# Patient Record
Sex: MALE | Race: WHITE | URBAN - METROPOLITAN AREA
[De-identification: names, ages, dates, MRNs, and addresses within clinical notes are randomized per-mention and may not be internally consistent; named-entity substitution may affect disease eponyms.]

---

## 2021-09-10 ENCOUNTER — APPOINTMENT (RX ONLY)
Dept: URBAN - METROPOLITAN AREA CLINIC 20 | Facility: CLINIC | Age: 46
Setting detail: DERMATOLOGY
End: 2021-09-10

## 2021-09-10 DIAGNOSIS — Z71.89 OTHER SPECIFIED COUNSELING: ICD-10-CM

## 2021-09-10 DIAGNOSIS — L91.8 OTHER HYPERTROPHIC DISORDERS OF THE SKIN: ICD-10-CM

## 2021-09-10 DIAGNOSIS — D22 MELANOCYTIC NEVI: ICD-10-CM

## 2021-09-10 DIAGNOSIS — L82.1 OTHER SEBORRHEIC KERATOSIS: ICD-10-CM

## 2021-09-10 DIAGNOSIS — L81.4 OTHER MELANIN HYPERPIGMENTATION: ICD-10-CM

## 2021-09-10 DIAGNOSIS — I78.8 OTHER DISEASES OF CAPILLARIES: ICD-10-CM

## 2021-09-10 DIAGNOSIS — D18.0 HEMANGIOMA: ICD-10-CM

## 2021-09-10 DIAGNOSIS — L73.8 OTHER SPECIFIED FOLLICULAR DISORDERS: ICD-10-CM

## 2021-09-10 PROBLEM — D18.01 HEMANGIOMA OF SKIN AND SUBCUTANEOUS TISSUE: Status: ACTIVE | Noted: 2021-09-10

## 2021-09-10 PROBLEM — D22.71 MELANOCYTIC NEVI OF RIGHT LOWER LIMB, INCLUDING HIP: Status: ACTIVE | Noted: 2021-09-10

## 2021-09-10 PROBLEM — D22.61 MELANOCYTIC NEVI OF RIGHT UPPER LIMB, INCLUDING SHOULDER: Status: ACTIVE | Noted: 2021-09-10

## 2021-09-10 PROBLEM — D22.5 MELANOCYTIC NEVI OF TRUNK: Status: ACTIVE | Noted: 2021-09-10

## 2021-09-10 PROBLEM — D48.5 NEOPLASM OF UNCERTAIN BEHAVIOR OF SKIN: Status: ACTIVE | Noted: 2021-09-10

## 2021-09-10 PROBLEM — D22.62 MELANOCYTIC NEVI OF LEFT UPPER LIMB, INCLUDING SHOULDER: Status: ACTIVE | Noted: 2021-09-10

## 2021-09-10 PROBLEM — D22.72 MELANOCYTIC NEVI OF LEFT LOWER LIMB, INCLUDING HIP: Status: ACTIVE | Noted: 2021-09-10

## 2021-09-10 PROCEDURE — 99203 OFFICE O/P NEW LOW 30 MIN: CPT | Mod: 25

## 2021-09-10 PROCEDURE — ? COUNSELING

## 2021-09-10 PROCEDURE — 11102 TANGNTL BX SKIN SINGLE LES: CPT

## 2021-09-10 PROCEDURE — ? BIOPSY BY SHAVE METHOD

## 2021-09-10 ASSESSMENT — LOCATION DETAILED DESCRIPTION DERM
LOCATION DETAILED: LEFT ANTERIOR DISTAL THIGH
LOCATION DETAILED: LEFT INFERIOR CENTRAL MALAR CHEEK
LOCATION DETAILED: LEFT DISTAL POSTERIOR THIGH
LOCATION DETAILED: RIGHT VENTRAL PROXIMAL FOREARM
LOCATION DETAILED: RIGHT ANTERIOR DISTAL UPPER ARM
LOCATION DETAILED: RIGHT INFERIOR ANTERIOR NECK
LOCATION DETAILED: RIGHT PROXIMAL PRETIBIAL REGION
LOCATION DETAILED: LEFT PROXIMAL POSTERIOR THIGH
LOCATION DETAILED: LEFT MID-UPPER BACK
LOCATION DETAILED: LEFT ANTERIOR DISTAL UPPER ARM
LOCATION DETAILED: LEFT VENTRAL PROXIMAL FOREARM
LOCATION DETAILED: LEFT VENTRAL DISTAL FOREARM
LOCATION DETAILED: RIGHT PROXIMAL POSTERIOR THIGH
LOCATION DETAILED: RIGHT ANTERIOR DISTAL THIGH
LOCATION DETAILED: LEFT PROXIMAL PRETIBIAL REGION
LOCATION DETAILED: RIGHT VENTRAL DISTAL FOREARM
LOCATION DETAILED: RIGHT INFERIOR MEDIAL UPPER BACK
LOCATION DETAILED: RIGHT LATERAL MALAR CHEEK
LOCATION DETAILED: RIGHT DISTAL POSTERIOR THIGH
LOCATION DETAILED: LEFT SUPERIOR MEDIAL MIDBACK
LOCATION DETAILED: EPIGASTRIC SKIN
LOCATION DETAILED: LEFT PROXIMAL DORSAL FOREARM
LOCATION DETAILED: RIGHT PROXIMAL DORSAL FOREARM

## 2021-09-10 ASSESSMENT — LOCATION SIMPLE DESCRIPTION DERM
LOCATION SIMPLE: LEFT LOWER BACK
LOCATION SIMPLE: ABDOMEN
LOCATION SIMPLE: LEFT POSTERIOR THIGH
LOCATION SIMPLE: LEFT UPPER ARM
LOCATION SIMPLE: RIGHT FOREARM
LOCATION SIMPLE: RIGHT ANTERIOR NECK
LOCATION SIMPLE: RIGHT UPPER BACK
LOCATION SIMPLE: RIGHT POSTERIOR THIGH
LOCATION SIMPLE: RIGHT PRETIBIAL REGION
LOCATION SIMPLE: LEFT PRETIBIAL REGION
LOCATION SIMPLE: RIGHT UPPER ARM
LOCATION SIMPLE: RIGHT CHEEK
LOCATION SIMPLE: LEFT FOREARM
LOCATION SIMPLE: RIGHT THIGH
LOCATION SIMPLE: LEFT THIGH
LOCATION SIMPLE: LEFT CHEEK
LOCATION SIMPLE: LEFT UPPER BACK

## 2021-09-10 ASSESSMENT — LOCATION ZONE DERM
LOCATION ZONE: TRUNK
LOCATION ZONE: LEG
LOCATION ZONE: NECK
LOCATION ZONE: FACE
LOCATION ZONE: ARM

## 2021-09-10 NOTE — PROCEDURE: BIOPSY BY SHAVE METHOD

## 2023-04-14 ENCOUNTER — OFFICE VISIT (OUTPATIENT)
Dept: MEDICAL GROUP | Facility: LAB | Age: 48
End: 2023-04-14
Payer: COMMERCIAL

## 2023-04-14 VITALS
WEIGHT: 254 LBS | SYSTOLIC BLOOD PRESSURE: 132 MMHG | OXYGEN SATURATION: 95 % | HEIGHT: 76 IN | TEMPERATURE: 97 F | RESPIRATION RATE: 12 BRPM | BODY MASS INDEX: 30.93 KG/M2 | HEART RATE: 85 BPM | DIASTOLIC BLOOD PRESSURE: 86 MMHG

## 2023-04-14 DIAGNOSIS — Z87.891 FORMER SMOKER: ICD-10-CM

## 2023-04-14 DIAGNOSIS — E66.9 OBESITY (BMI 30-39.9): ICD-10-CM

## 2023-04-14 DIAGNOSIS — Z23 NEED FOR VACCINATION: ICD-10-CM

## 2023-04-14 DIAGNOSIS — Z12.5 PROSTATE CANCER SCREENING: ICD-10-CM

## 2023-04-14 DIAGNOSIS — K21.9 GASTROESOPHAGEAL REFLUX DISEASE WITHOUT ESOPHAGITIS: ICD-10-CM

## 2023-04-14 DIAGNOSIS — Z12.11 COLON CANCER SCREENING: ICD-10-CM

## 2023-04-14 DIAGNOSIS — Z00.00 HEALTH CARE MAINTENANCE: ICD-10-CM

## 2023-04-14 DIAGNOSIS — E78.49 OTHER HYPERLIPIDEMIA: ICD-10-CM

## 2023-04-14 PROCEDURE — 99204 OFFICE O/P NEW MOD 45 MIN: CPT | Mod: 25 | Performed by: STUDENT IN AN ORGANIZED HEALTH CARE EDUCATION/TRAINING PROGRAM

## 2023-04-14 PROCEDURE — 90715 TDAP VACCINE 7 YRS/> IM: CPT | Performed by: STUDENT IN AN ORGANIZED HEALTH CARE EDUCATION/TRAINING PROGRAM

## 2023-04-14 PROCEDURE — 90471 IMMUNIZATION ADMIN: CPT | Performed by: STUDENT IN AN ORGANIZED HEALTH CARE EDUCATION/TRAINING PROGRAM

## 2023-04-14 RX ORDER — M-VIT,TX,IRON,MINS/CALC/FOLIC 27MG-0.4MG
1 TABLET ORAL DAILY
COMMUNITY

## 2023-04-14 RX ORDER — OMEPRAZOLE 20 MG/1
20 CAPSULE, DELAYED RELEASE ORAL DAILY
COMMUNITY

## 2023-04-14 ASSESSMENT — ENCOUNTER SYMPTOMS
FEVER: 0
SHORTNESS OF BREATH: 0
CHILLS: 0

## 2023-04-14 NOTE — PROGRESS NOTES
"Subjective:     CC:  Diagnoses of Gastroesophageal reflux disease without esophagitis, Colon cancer screening, Former smoker, Health care maintenance, Prostate cancer screening, Other hyperlipidemia, Need for vaccination, and Obesity (BMI 30-39.9) were pertinent to this visit.    HISTORY OF THE PRESENT ILLNESS: Patient is a 47 y.o. male with the following medical problems No past medical history on file.  . This pleasant patient is here today to establish care and discuss the following;    Problem   Gastroesophageal Reflux Disease Without Esophagitis    Patient takes OTC omeprazole. No dysphagia      Former Smoker    Started smoking when he was roughly 18 and quit after 8 years      Other Hyperlipidemia    Elevated in the past. His mother is on atorvastatin     Obesity (Bmi 30-39.9)       Current Outpatient Medications Ordered in Epic   Medication Sig Dispense Refill    therapeutic multivitamin-minerals (THERAGRAN-M) Tab Take 1 Tablet by mouth every day.      omeprazole (PRILOSEC) 20 MG delayed-release capsule Take 20 mg by mouth every day.       No current [facility]-ordered facility-administered medications on file.         ROS:   Review of Systems   Constitutional:  Negative for chills and fever.   Respiratory:  Negative for shortness of breath.    Cardiovascular:  Negative for chest pain.       Objective:     Exam: /86 (BP Location: Left arm, Patient Position: Sitting, BP Cuff Size: Adult)   Pulse 85   Temp 36.1 °C (97 °F) (Temporal)   Resp 12   Ht 1.93 m (6' 4\")   Wt 115 kg (254 lb)   SpO2 95%  Body mass index is 30.92 kg/m².    Physical Exam  Constitutional:       General: He is not in acute distress.     Appearance: He is not ill-appearing.   Pulmonary:      Effort: Pulmonary effort is normal.   Neurological:      Mental Status: He is alert.   Psychiatric:         Mood and Affect: Mood normal.         Behavior: Behavior normal.         Thought Content: Thought content normal.         Judgment: Judgment " normal.             Assessment & Plan:     Problem List Items Addressed This Visit       Former smoker    Gastroesophageal reflux disease without esophagitis     Chronic-stable         Relevant Medications    omeprazole (PRILOSEC) 20 MG delayed-release capsule    Obesity (BMI 30-39.9)    Other hyperlipidemia     Chronic  -check lipid panel          Other Visit Diagnoses       Colon cancer screening        Relevant Orders    Referral to GI for Colonoscopy    Health care maintenance        Relevant Orders    VITAMIN D,25 HYDROXY (DEFICIENCY)    HEMOGLOBIN A1C    CBC WITH DIFFERENTIAL    Comp Metabolic Panel    TSH WITH REFLEX TO FT4    Lipid Profile    Prostate cancer screening        Relevant Orders    PROSTATE SPECIFIC AG SCREENING    Need for vaccination        Relevant Orders    Tdap =>8yo IM              Return in about 4 weeks (around 5/12/2023).    Please note that this dictation was created using voice recognition software. I have made every reasonable attempt to correct obvious errors, but I expect that there are errors of grammar and possibly content that I did not discover before finalizing the note.

## 2023-04-19 ENCOUNTER — HOSPITAL ENCOUNTER (OUTPATIENT)
Dept: LAB | Facility: MEDICAL CENTER | Age: 48
End: 2023-04-19
Attending: STUDENT IN AN ORGANIZED HEALTH CARE EDUCATION/TRAINING PROGRAM
Payer: COMMERCIAL

## 2023-04-19 DIAGNOSIS — Z12.5 PROSTATE CANCER SCREENING: ICD-10-CM

## 2023-04-19 DIAGNOSIS — Z00.00 HEALTH CARE MAINTENANCE: ICD-10-CM

## 2023-04-19 LAB
25(OH)D3 SERPL-MCNC: 50 NG/ML (ref 30–100)
ALBUMIN SERPL BCP-MCNC: 4.5 G/DL (ref 3.2–4.9)
ALBUMIN/GLOB SERPL: 1.7 G/DL
ALP SERPL-CCNC: 68 U/L (ref 30–99)
ALT SERPL-CCNC: 33 U/L (ref 2–50)
ANION GAP SERPL CALC-SCNC: 9 MMOL/L (ref 7–16)
AST SERPL-CCNC: 31 U/L (ref 12–45)
BASOPHILS # BLD AUTO: 0.5 % (ref 0–1.8)
BASOPHILS # BLD: 0.03 K/UL (ref 0–0.12)
BILIRUB SERPL-MCNC: 0.6 MG/DL (ref 0.1–1.5)
BUN SERPL-MCNC: 11 MG/DL (ref 8–22)
CALCIUM ALBUM COR SERPL-MCNC: 9.1 MG/DL (ref 8.5–10.5)
CALCIUM SERPL-MCNC: 9.5 MG/DL (ref 8.5–10.5)
CHLORIDE SERPL-SCNC: 105 MMOL/L (ref 96–112)
CHOLEST SERPL-MCNC: 243 MG/DL (ref 100–199)
CO2 SERPL-SCNC: 25 MMOL/L (ref 20–33)
CREAT SERPL-MCNC: 1.25 MG/DL (ref 0.5–1.4)
EOSINOPHIL # BLD AUTO: 0.1 K/UL (ref 0–0.51)
EOSINOPHIL NFR BLD: 1.8 % (ref 0–6.9)
ERYTHROCYTE [DISTWIDTH] IN BLOOD BY AUTOMATED COUNT: 41.6 FL (ref 35.9–50)
EST. AVERAGE GLUCOSE BLD GHB EST-MCNC: 108 MG/DL
FASTING STATUS PATIENT QL REPORTED: NORMAL
GFR SERPLBLD CREATININE-BSD FMLA CKD-EPI: 70 ML/MIN/1.73 M 2
GLOBULIN SER CALC-MCNC: 2.7 G/DL (ref 1.9–3.5)
GLUCOSE SERPL-MCNC: 108 MG/DL (ref 65–99)
HBA1C MFR BLD: 5.4 % (ref 4–5.6)
HCT VFR BLD AUTO: 50.1 % (ref 42–52)
HDLC SERPL-MCNC: 37 MG/DL
HGB BLD-MCNC: 17.7 G/DL (ref 14–18)
IMM GRANULOCYTES # BLD AUTO: 0.02 K/UL (ref 0–0.11)
IMM GRANULOCYTES NFR BLD AUTO: 0.4 % (ref 0–0.9)
LDLC SERPL CALC-MCNC: 165 MG/DL
LYMPHOCYTES # BLD AUTO: 1.97 K/UL (ref 1–4.8)
LYMPHOCYTES NFR BLD: 35.8 % (ref 22–41)
MCH RBC QN AUTO: 33 PG (ref 27–33)
MCHC RBC AUTO-ENTMCNC: 35.3 G/DL (ref 33.7–35.3)
MCV RBC AUTO: 93.5 FL (ref 81.4–97.8)
MONOCYTES # BLD AUTO: 0.59 K/UL (ref 0–0.85)
MONOCYTES NFR BLD AUTO: 10.7 % (ref 0–13.4)
NEUTROPHILS # BLD AUTO: 2.79 K/UL (ref 1.82–7.42)
NEUTROPHILS NFR BLD: 50.8 % (ref 44–72)
NRBC # BLD AUTO: 0 K/UL
NRBC BLD-RTO: 0 /100 WBC
PLATELET # BLD AUTO: 167 K/UL (ref 164–446)
PMV BLD AUTO: 11 FL (ref 9–12.9)
POTASSIUM SERPL-SCNC: 4.4 MMOL/L (ref 3.6–5.5)
PROT SERPL-MCNC: 7.2 G/DL (ref 6–8.2)
PSA SERPL-MCNC: 0.75 NG/ML (ref 0–4)
RBC # BLD AUTO: 5.36 M/UL (ref 4.7–6.1)
SODIUM SERPL-SCNC: 139 MMOL/L (ref 135–145)
T4 FREE SERPL-MCNC: 0.96 NG/DL (ref 0.93–1.7)
TRIGL SERPL-MCNC: 204 MG/DL (ref 0–149)
TSH SERPL DL<=0.005 MIU/L-ACNC: 5.36 UIU/ML (ref 0.38–5.33)
WBC # BLD AUTO: 5.5 K/UL (ref 4.8–10.8)

## 2023-04-19 PROCEDURE — 84439 ASSAY OF FREE THYROXINE: CPT

## 2023-04-19 PROCEDURE — 83036 HEMOGLOBIN GLYCOSYLATED A1C: CPT

## 2023-04-19 PROCEDURE — 82306 VITAMIN D 25 HYDROXY: CPT

## 2023-04-19 PROCEDURE — 85025 COMPLETE CBC W/AUTO DIFF WBC: CPT

## 2023-04-19 PROCEDURE — 80061 LIPID PANEL: CPT

## 2023-04-19 PROCEDURE — 84153 ASSAY OF PSA TOTAL: CPT

## 2023-04-19 PROCEDURE — 84443 ASSAY THYROID STIM HORMONE: CPT

## 2023-04-19 PROCEDURE — 36415 COLL VENOUS BLD VENIPUNCTURE: CPT

## 2023-04-19 PROCEDURE — 80053 COMPREHEN METABOLIC PANEL: CPT

## 2023-06-09 ENCOUNTER — OFFICE VISIT (OUTPATIENT)
Dept: MEDICAL GROUP | Facility: LAB | Age: 48
End: 2023-06-09
Payer: COMMERCIAL

## 2023-06-09 VITALS
BODY MASS INDEX: 30.95 KG/M2 | SYSTOLIC BLOOD PRESSURE: 130 MMHG | DIASTOLIC BLOOD PRESSURE: 82 MMHG | HEIGHT: 76 IN | TEMPERATURE: 97.5 F | OXYGEN SATURATION: 96 % | WEIGHT: 254.19 LBS | RESPIRATION RATE: 16 BRPM | HEART RATE: 86 BPM

## 2023-06-09 DIAGNOSIS — E03.8 SUBCLINICAL HYPOTHYROIDISM: ICD-10-CM

## 2023-06-09 DIAGNOSIS — E55.9 VITAMIN D DEFICIENCY: ICD-10-CM

## 2023-06-09 DIAGNOSIS — Z00.00 ANNUAL PHYSICAL EXAM: ICD-10-CM

## 2023-06-09 DIAGNOSIS — Z00.00 HEALTH MAINTENANCE EXAMINATION: ICD-10-CM

## 2023-06-09 DIAGNOSIS — E78.49 OTHER HYPERLIPIDEMIA: ICD-10-CM

## 2023-06-09 PROCEDURE — 3075F SYST BP GE 130 - 139MM HG: CPT | Performed by: STUDENT IN AN ORGANIZED HEALTH CARE EDUCATION/TRAINING PROGRAM

## 2023-06-09 PROCEDURE — 99396 PREV VISIT EST AGE 40-64: CPT | Performed by: STUDENT IN AN ORGANIZED HEALTH CARE EDUCATION/TRAINING PROGRAM

## 2023-06-09 PROCEDURE — 3079F DIAST BP 80-89 MM HG: CPT | Performed by: STUDENT IN AN ORGANIZED HEALTH CARE EDUCATION/TRAINING PROGRAM

## 2023-06-09 ASSESSMENT — ENCOUNTER SYMPTOMS
SPUTUM PRODUCTION: 0
BLOOD IN STOOL: 0
PALPITATIONS: 0
VOMITING: 0
ABDOMINAL PAIN: 0
CHILLS: 0
COUGH: 0
FEVER: 0

## 2023-06-09 ASSESSMENT — FIBROSIS 4 INDEX: FIB4 SCORE: 1.58

## 2023-06-09 ASSESSMENT — PATIENT HEALTH QUESTIONNAIRE - PHQ9: CLINICAL INTERPRETATION OF PHQ2 SCORE: 0

## 2023-06-09 NOTE — PROGRESS NOTES
Subjective:     Subjective:     CC:   Chief Complaint   Patient presents with    Annual Exam     Blood work       HPI:   Rick Francisco is a 47 y.o. male who presents for annual exam    Last Colorectal Cancer Screening: already ordered   Last Tdap: 2023  Received HPV series: Aged out  Hx STDs: No    Exercise: 3x/week cardiovascular   Diet: eats a lot of carb     He  has no past medical history on file.  He  has a past surgical history that includes hernia repair.    Family History   Problem Relation Age of Onset    Hyperlipidemia Mother     Leukemia Father     Hypertension Maternal Grandfather      Social History     Tobacco Use    Smoking status: Former     Types: Cigarettes    Smokeless tobacco: Never   Vaping Use    Vaping Use: Never used   Substance Use Topics    Alcohol use: Yes     Alcohol/week: 1.8 oz     Types: 3 Cans of beer per week    Drug use: Never     He  reports being sexually active.    Patient Active Problem List    Diagnosis Date Noted    Vitamin D deficiency 06/09/2023    Subclinical hypothyroidism 06/09/2023    Annual physical exam 06/09/2023    Gastroesophageal reflux disease without esophagitis 04/14/2023    Former smoker 04/14/2023    Other hyperlipidemia 04/14/2023    Obesity (BMI 30-39.9) 04/14/2023     Current Outpatient Medications   Medication Sig Dispense Refill    therapeutic multivitamin-minerals (THERAGRAN-M) Tab Take 1 Tablet by mouth every day.      omeprazole (PRILOSEC) 20 MG delayed-release capsule Take 20 mg by mouth every day.       No current facility-administered medications for this visit.     Allergies   Allergen Reactions    Penicillins Hives       Review of Systems   Review of Systems   Constitutional:  Negative for chills and fever.   Respiratory:  Negative for cough and sputum production.    Cardiovascular:  Negative for chest pain and palpitations.   Gastrointestinal:  Negative for abdominal pain, blood in stool and vomiting.        Objective:   /82 (BP  "Location: Right arm, Patient Position: Sitting, BP Cuff Size: Adult)   Pulse 86   Temp 36.4 °C (97.5 °F)   Resp 16   Ht 1.93 m (6' 4\")   Wt 115 kg (254 lb 3.1 oz)   SpO2 96%   BMI 30.94 kg/m²      Wt Readings from Last 4 Encounters:   06/09/23 115 kg (254 lb 3.1 oz)   04/14/23 115 kg (254 lb)           Physical Exam:  Physical Exam  Constitutional:       Appearance: Normal appearance.   HENT:      Head: Normocephalic and atraumatic.      Right Ear: Tympanic membrane and ear canal normal.      Left Ear: Tympanic membrane and ear canal normal.      Mouth/Throat:      Mouth: Mucous membranes are moist.      Pharynx: Oropharynx is clear.   Eyes:      Extraocular Movements: Extraocular movements intact.      Pupils: Pupils are equal, round, and reactive to light.   Neck:      Thyroid: No thyromegaly.   Cardiovascular:      Rate and Rhythm: Normal rate and regular rhythm.      Heart sounds: Normal heart sounds.   Pulmonary:      Effort: Pulmonary effort is normal.      Breath sounds: Normal breath sounds.   Abdominal:      General: Abdomen is flat. Bowel sounds are normal.      Palpations: Abdomen is soft.   Musculoskeletal:      Cervical back: Normal range of motion and neck supple.      Right lower leg: No edema.      Left lower leg: No edema.   Lymphadenopathy:      Cervical: No cervical adenopathy.   Skin:     General: Skin is warm and dry.   Neurological:      General: No focal deficit present.      Mental Status: He is alert.             Assessment and Plan:     Problem List Items Addressed This Visit       Annual physical exam    Other hyperlipidemia     Chronic  -will check cardiac score          Relevant Orders    CT-CARDIAC SCORING    Subclinical hypothyroidism    Vitamin D deficiency     Other Visit Diagnoses       Health maintenance examination        Relevant Orders    VITAMIN D,25 HYDROXY (DEFICIENCY)    HEMOGLOBIN A1C    CBC WITH DIFFERENTIAL    Comp Metabolic Panel    TSH WITH REFLEX TO FT4    Lipid " Profile             Health maintenance:    Labs per orders  Immunizations per orders  Patient counseled about skin care, diet, supplements, and exercise.  Discussed diet and exercise     Follow-up: Return in about 1 year (around 6/9/2024) for annual .

## 2023-06-27 ENCOUNTER — HOSPITAL ENCOUNTER (OUTPATIENT)
Dept: RADIOLOGY | Facility: MEDICAL CENTER | Age: 48
End: 2023-06-27
Attending: STUDENT IN AN ORGANIZED HEALTH CARE EDUCATION/TRAINING PROGRAM
Payer: COMMERCIAL

## 2023-06-27 DIAGNOSIS — E78.49 OTHER HYPERLIPIDEMIA: ICD-10-CM

## 2023-06-27 PROCEDURE — 4410556 CT-CARDIAC SCORING (SELF PAY ONLY)

## 2024-08-27 ENCOUNTER — APPOINTMENT (OUTPATIENT)
Dept: MEDICAL GROUP | Facility: LAB | Age: 49
End: 2024-08-27

## 2025-01-09 ENCOUNTER — APPOINTMENT (OUTPATIENT)
Dept: MEDICAL GROUP | Facility: LAB | Age: 50
End: 2025-01-09

## 2025-02-16 ENCOUNTER — APPOINTMENT (OUTPATIENT)
Dept: RADIOLOGY | Facility: MEDICAL CENTER | Age: 50
End: 2025-02-16
Attending: EMERGENCY MEDICINE
Payer: COMMERCIAL

## 2025-02-16 ENCOUNTER — PHARMACY VISIT (OUTPATIENT)
Dept: PHARMACY | Facility: MEDICAL CENTER | Age: 50
End: 2025-02-16
Payer: COMMERCIAL

## 2025-02-16 ENCOUNTER — HOSPITAL ENCOUNTER (EMERGENCY)
Facility: MEDICAL CENTER | Age: 50
End: 2025-02-16
Attending: EMERGENCY MEDICINE
Payer: COMMERCIAL

## 2025-02-16 VITALS
OXYGEN SATURATION: 96 % | BODY MASS INDEX: 30.15 KG/M2 | HEART RATE: 91 BPM | TEMPERATURE: 97.2 F | WEIGHT: 247.58 LBS | DIASTOLIC BLOOD PRESSURE: 89 MMHG | HEIGHT: 76 IN | SYSTOLIC BLOOD PRESSURE: 148 MMHG | RESPIRATION RATE: 16 BRPM

## 2025-02-16 DIAGNOSIS — I82.432 ACUTE DEEP VEIN THROMBOSIS (DVT) OF POPLITEAL VEIN OF LEFT LOWER EXTREMITY (HCC): ICD-10-CM

## 2025-02-16 LAB
ALBUMIN SERPL BCP-MCNC: 4.1 G/DL (ref 3.2–4.9)
ALBUMIN/GLOB SERPL: 1.2 G/DL
ALP SERPL-CCNC: 76 U/L (ref 30–99)
ALT SERPL-CCNC: 22 U/L (ref 2–50)
ANION GAP SERPL CALC-SCNC: 14 MMOL/L (ref 7–16)
AST SERPL-CCNC: 18 U/L (ref 12–45)
BASOPHILS # BLD AUTO: 0.3 % (ref 0–1.8)
BASOPHILS # BLD: 0.03 K/UL (ref 0–0.12)
BILIRUB SERPL-MCNC: 0.6 MG/DL (ref 0.1–1.5)
BUN SERPL-MCNC: 11 MG/DL (ref 8–22)
CALCIUM ALBUM COR SERPL-MCNC: 9.3 MG/DL (ref 8.5–10.5)
CALCIUM SERPL-MCNC: 9.4 MG/DL (ref 8.4–10.2)
CHLORIDE SERPL-SCNC: 102 MMOL/L (ref 96–112)
CO2 SERPL-SCNC: 23 MMOL/L (ref 20–33)
CREAT SERPL-MCNC: 1.13 MG/DL (ref 0.5–1.4)
EOSINOPHIL # BLD AUTO: 0.13 K/UL (ref 0–0.51)
EOSINOPHIL NFR BLD: 1.2 % (ref 0–6.9)
ERYTHROCYTE [DISTWIDTH] IN BLOOD BY AUTOMATED COUNT: 39.3 FL (ref 35.9–50)
GFR SERPLBLD CREATININE-BSD FMLA CKD-EPI: 78 ML/MIN/1.73 M 2
GLOBULIN SER CALC-MCNC: 3.4 G/DL (ref 1.9–3.5)
GLUCOSE SERPL-MCNC: 109 MG/DL (ref 65–99)
HCT VFR BLD AUTO: 45.4 % (ref 42–52)
HGB BLD-MCNC: 16.1 G/DL (ref 14–18)
IMM GRANULOCYTES # BLD AUTO: 0.04 K/UL (ref 0–0.11)
IMM GRANULOCYTES NFR BLD AUTO: 0.4 % (ref 0–0.9)
INR PPP: 1.08 (ref 0.87–1.13)
LYMPHOCYTES # BLD AUTO: 1.84 K/UL (ref 1–4.8)
LYMPHOCYTES NFR BLD: 17.2 % (ref 22–41)
MCH RBC QN AUTO: 32.7 PG (ref 27–33)
MCHC RBC AUTO-ENTMCNC: 35.5 G/DL (ref 32.3–36.5)
MCV RBC AUTO: 92.3 FL (ref 81.4–97.8)
MONOCYTES # BLD AUTO: 0.87 K/UL (ref 0–0.85)
MONOCYTES NFR BLD AUTO: 8.1 % (ref 0–13.4)
NEUTROPHILS # BLD AUTO: 7.78 K/UL (ref 1.82–7.42)
NEUTROPHILS NFR BLD: 72.8 % (ref 44–72)
NRBC # BLD AUTO: 0 K/UL
NRBC BLD-RTO: 0 /100 WBC (ref 0–0.2)
PLATELET # BLD AUTO: 166 K/UL (ref 164–446)
PMV BLD AUTO: 10.8 FL (ref 9–12.9)
POTASSIUM SERPL-SCNC: 4.3 MMOL/L (ref 3.6–5.5)
PROT SERPL-MCNC: 7.5 G/DL (ref 6–8.2)
PROTHROMBIN TIME: 14.4 SEC (ref 12–14.6)
RBC # BLD AUTO: 4.92 M/UL (ref 4.7–6.1)
SODIUM SERPL-SCNC: 139 MMOL/L (ref 135–145)
WBC # BLD AUTO: 10.7 K/UL (ref 4.8–10.8)

## 2025-02-16 PROCEDURE — 99284 EMERGENCY DEPT VISIT MOD MDM: CPT

## 2025-02-16 PROCEDURE — 93971 EXTREMITY STUDY: CPT | Mod: LT

## 2025-02-16 PROCEDURE — RXMED WILLOW AMBULATORY MEDICATION CHARGE: Performed by: EMERGENCY MEDICINE

## 2025-02-16 PROCEDURE — 700102 HCHG RX REV CODE 250 W/ 637 OVERRIDE(OP): Performed by: EMERGENCY MEDICINE

## 2025-02-16 PROCEDURE — 85610 PROTHROMBIN TIME: CPT

## 2025-02-16 PROCEDURE — 80053 COMPREHEN METABOLIC PANEL: CPT

## 2025-02-16 PROCEDURE — A9270 NON-COVERED ITEM OR SERVICE: HCPCS | Performed by: EMERGENCY MEDICINE

## 2025-02-16 PROCEDURE — 85025 COMPLETE CBC W/AUTO DIFF WBC: CPT

## 2025-02-16 PROCEDURE — 36415 COLL VENOUS BLD VENIPUNCTURE: CPT

## 2025-02-16 RX ORDER — OXYCODONE HYDROCHLORIDE 5 MG/1
5 TABLET ORAL EVERY 4 HOURS PRN
Qty: 20 TABLET | Refills: 0 | Status: SHIPPED | OUTPATIENT
Start: 2025-02-16 | End: 2025-02-21

## 2025-02-16 RX ORDER — OXYCODONE HYDROCHLORIDE 5 MG/1
5 TABLET ORAL EVERY 4 HOURS PRN
Qty: 20 TABLET | Refills: 0 | Status: SHIPPED | OUTPATIENT
Start: 2025-02-16 | End: 2025-02-16

## 2025-02-16 RX ADMIN — APIXABAN 10 MG: 5 TABLET, FILM COATED ORAL at 12:54

## 2025-02-16 ASSESSMENT — FIBROSIS 4 INDEX: FIB4 SCORE: 1.65

## 2025-02-16 NOTE — ED PROVIDER NOTES
"ED Provider Note    CHIEF COMPLAINT  Chief Complaint   Patient presents with    Leg Pain     L calf since Friday. Denies CP or SOB. Concerned for DVT.            HPI/ROS      Rick Francisco is a 49 y.o. male who presents with chief complaint of left calf pain.  Patient reports that Pain started spontaneously 2 days ago.  He reports he had some pain in his calf, and some swelling.  This is progressively worsened over the last 2 days.  He denies associated fevers or chills.  He reports some pain on ambulation in the back of his calf.  He denies any associated significant anterior knee pain or hip pain or ankle pain.  Denies any trauma.  Denies a history of blood clots.  He denies any chest pain shortness of breath.    PAST MEDICAL HISTORY   has a past medical history of Patient denies medical problems.    SURGICAL HISTORY   has a past surgical history that includes hernia repair.    FAMILY HISTORY  Family History   Problem Relation Age of Onset    Hyperlipidemia Mother     Leukemia Father     Hypertension Maternal Grandfather        SOCIAL HISTORY  Social History     Tobacco Use    Smoking status: Former     Types: Cigarettes    Smokeless tobacco: Never   Vaping Use    Vaping status: Never Used   Substance and Sexual Activity    Alcohol use: Yes     Alcohol/week: 1.8 oz     Types: 3 Cans of beer per week    Drug use: Never    Sexual activity: Yes       CURRENT MEDICATIONS  Home Medications       Reviewed by Safia Jaimes R.N. (Registered Nurse) on 02/16/25 at [facility]  Med List Status: Not Addressed     Medication Last Dose Status   omeprazole (PRILOSEC) 20 MG delayed-release capsule  Active   therapeutic multivitamin-minerals (THERAGRAN-M) Tab  Active                    ALLERGIES  Allergies   Allergen Reactions    Penicillins Hives       PHYSICAL EXAM  VITAL SIGNS: BP (!) 158/92   Pulse (!) 118   Temp 35.8 °C (96.5 °F) (Temporal)   Resp 18   Ht 1.93 m (6' 4\")   Wt 112 kg (247 lb 9.2 oz)   SpO2 94%   BMI 30.14 " kg/m²    Physical Exam  Constitutional:       Appearance: Normal appearance.   HENT:      Mouth/Throat:      Mouth: Mucous membranes are moist.   Pulmonary:      Effort: Pulmonary effort is normal.   Musculoskeletal:      Comments: Left lower extremity with increased diameter compared to right.  Trace pitting edema.  Mildly edematous.  No associated cords.  Full range of motion of hip knee and ankle without significant pain evoked.  Sensation intact throughout.  Distal pulses are 2+, cap refill is instantaneous.   Neurological:      General: No focal deficit present.      Mental Status: He is alert and oriented to person, place, and time.   Psychiatric:         Mood and Affect: Mood normal.           EKG/LABS  Results for orders placed or performed during the hospital encounter of 02/16/25   CBC WITH DIFFERENTIAL    Collection Time: 02/16/25 12:25 PM   Result Value Ref Range    WBC 10.7 4.8 - 10.8 K/uL    RBC 4.92 4.70 - 6.10 M/uL    Hemoglobin 16.1 14.0 - 18.0 g/dL    Hematocrit 45.4 42.0 - 52.0 %    MCV 92.3 81.4 - 97.8 fL    MCH 32.7 27.0 - 33.0 pg    MCHC 35.5 32.3 - 36.5 g/dL    RDW 39.3 35.9 - 50.0 fL    Platelet Count 166 164 - 446 K/uL    MPV 10.8 9.0 - 12.9 fL    Neutrophils-Polys 72.80 (H) 44.00 - 72.00 %    Lymphocytes 17.20 (L) 22.00 - 41.00 %    Monocytes 8.10 0.00 - 13.40 %    Eosinophils 1.20 0.00 - 6.90 %    Basophils 0.30 0.00 - 1.80 %    Immature Granulocytes 0.40 0.00 - 0.90 %    Nucleated RBC 0.00 0.00 - 0.20 /100 WBC    Neutrophils (Absolute) 7.78 (H) 1.82 - 7.42 K/uL    Lymphs (Absolute) 1.84 1.00 - 4.80 K/uL    Monos (Absolute) 0.87 (H) 0.00 - 0.85 K/uL    Eos (Absolute) 0.13 0.00 - 0.51 K/uL    Baso (Absolute) 0.03 0.00 - 0.12 K/uL    Immature Granulocytes (abs) 0.04 0.00 - 0.11 K/uL    NRBC (Absolute) 0.00 K/uL   CMP    Collection Time: 02/16/25 12:25 PM   Result Value Ref Range    Sodium 139 135 - 145 mmol/L    Potassium 4.3 3.6 - 5.5 mmol/L    Chloride 102 96 - 112 mmol/L    Co2 23 20 - 33  mmol/L    Anion Gap 14.0 7.0 - 16.0    Glucose 109 (H) 65 - 99 mg/dL    Bun 11 8 - 22 mg/dL    Creatinine 1.13 0.50 - 1.40 mg/dL    Calcium 9.4 8.4 - 10.2 mg/dL    Correct Calcium 9.3 8.5 - 10.5 mg/dL    AST(SGOT) 18 12 - 45 U/L    ALT(SGPT) 22 2 - 50 U/L    Alkaline Phosphatase 76 30 - 99 U/L    Total Bilirubin 0.6 0.1 - 1.5 mg/dL    Albumin 4.1 3.2 - 4.9 g/dL    Total Protein 7.5 6.0 - 8.2 g/dL    Globulin 3.4 1.9 - 3.5 g/dL    A-G Ratio 1.2 g/dL   PT/INR    Collection Time: 02/16/25 12:25 PM   Result Value Ref Range    PT 14.4 12.0 - 14.6 sec    INR 1.08 0.87 - 1.13   ESTIMATED GFR    Collection Time: 02/16/25 12:25 PM   Result Value Ref Range    GFR (CKD-EPI) 78 >60 mL/min/1.73 m 2       I have independently interpreted this EKG    RADIOLOGY/PROCEDURES   I have independently interpreted the diagnostic imaging associated with this visit and am waiting the final reading from the radiologist.   My preliminary interpretation is as follows: Large DVT extending femoral vein    Radiologist interpretation:  US-EXTREMITY VENOUS LOWER UNILAT LEFT   Final Result      LEFT leg DVT from mid femoral vein extending into the calf, likely acute.      These findings were electronically conveyed to and received by YOSHI MOSS on 2/16/2025 12:15 PM.          COURSE & MEDICAL DECISION MAKING    ASSESSMENT, COURSE AND PLAN  Care Narrative: Here with possible DVT versus ruptured Baker's cyst.  Checking ultrasound.  Patient already took 800 mg of ibuprofen prior to arrival and is feeling much improved.  He was offered analgesics but currently does not need them.  Patient DVT positive on ultrasound.  On reassessment patient remains with great pulses and sensation.  No tense compartments.  Patient will have basic labs checked to ensure he does not have any associated thrombocytopenia or severe anemia.  These are normal.  Patient started on DOAC.  Patient discharged in good condition.  Referral to anticoagulation clinic sent.  Patient  to follow-up close with his primary care provider.  Patient remains with reassuring vitals and no pulmonary symptoms.              FINAL DIAGNOSIS  1. Acute deep vein thrombosis (DVT) of popliteal vein of left lower extremity (HCC)  Referral to Anticoagulation Monitoring    apixaban (ELIQUIS) 5mg Tab    oxyCODONE immediate-release (ROXICODONE) 5 MG Tab

## 2025-02-16 NOTE — DISCHARGE INSTRUCTIONS
You will need to follow-up with your primary care provider.  They should do a workup on you for hypercoagulability to look into why you developed a blood clot.  Make sure you take the blood thinner as directed.  Do not miss any doses.  If you develop significant chest pain, shortness of breath or find that it becomes very difficult to walk a short distance without becoming very fatigued please return to the emergency department.  If your symptoms worsen otherwise significantly please return.  Try to keep your legs elevated when possible.  You can take Tylenol for pain.  For breakthrough pain you can use the oxycodone

## 2025-02-16 NOTE — ED NOTES
Extensive education r/t Eliquis, monitoring and follow up care.  Addressed multiple questions r/t Eliquis, monitoring and follow up care.

## 2025-02-16 NOTE — ED NOTES
Extensive discharge education w/ pt and visitor, addressed all further questions/concerns.   Pt thanked RN for time and care.  Pt ambulated from ED w/ visitor.

## 2025-02-16 NOTE — ED TRIAGE NOTES
"Chief Complaint   Patient presents with    Leg Pain     L calf since Friday. Denies CP or SOB. Concerned for DVT.      Physical Exam  Pulmonary:      Effort: Pulmonary effort is normal.   Skin:     General: Skin is warm and dry.   Neurological:      Mental Status: He is alert.       BP (!) 158/92   Pulse (!) 118   Temp 35.8 °C (96.5 °F) (Temporal)   Resp 18   Ht 1.93 m (6' 4\")   Wt 112 kg (247 lb 9.2 oz)   SpO2 94%   BMI 30.14 kg/m²     "

## 2025-02-16 NOTE — DISCHARGE PLANNING
Anticipated Discharge Disposition: Home    Action: Spoke with [facility] went thru but co pay Over 700.00.  Asked [facility] to eval [facility] also 500.00 plus. Unable to access Eliquis card program. Spoke with Pharmacist EDISON. Recommendation send to [facility] to see if can assist with Meds to Beds or Card program. Pharmacist spoke with [facility] Pharmacist    Barriers to Discharge: RX    Plan: Will cont to follow

## 2025-02-18 ENCOUNTER — TELEPHONE (OUTPATIENT)
Dept: VASCULAR LAB | Facility: MEDICAL CENTER | Age: 50
End: 2025-02-18
Payer: COMMERCIAL

## 2025-02-18 NOTE — DISCHARGE PLANNING
Anticipated Discharge Disposition: Home    Action: Call from wife. She notes that the leg slightly more swollen. She notes that has pain and increase swelling. Advised that he can return to ER for recheck. She placed pt on phone. He notes redness and  increased swelling. Encourage to return for recheck. Recommend return to [facility] if recheck . Discussed Eliquis and how it work, pain med, elevation and anticaog precautions ( no shaving No NSAID) encouraged to go Eliquis website as well. He should be following with anticoag clinic as well and PCP Dr Cornejo    Barriers to Discharge: None.    Plan:  No further needs tonight but they are aware to call back if needed and please return to ER for recheck

## 2025-02-18 NOTE — TELEPHONE ENCOUNTER
[facility]     Received anticoagulation referral from [facility] on 02/16/25.     Called and spoke to patient. Initial visit scheduled on 03/17/25 at 11:15am.    1st attempt     Insurance: [facility]  PCP: [facility]  Locations to be seen: Any    If no response by 03/18/25 OR 2 no shows/cancellations, will remove from referral list    Hiram Alonzo PharmD  [facility]armacotherapy [facility]  Phone [phone]

## 2025-02-19 ENCOUNTER — PATIENT MESSAGE (OUTPATIENT)
Dept: MEDICAL GROUP | Facility: LAB | Age: 50
End: 2025-02-19
Payer: COMMERCIAL

## 2025-02-19 DIAGNOSIS — I82.412 DEEP VENOUS THROMBOSIS OF LEFT PROFUNDA FEMORIS VEIN (HCC): ICD-10-CM

## 2025-02-19 DIAGNOSIS — I82.432 ACUTE DEEP VEIN THROMBOSIS (DVT) OF POPLITEAL VEIN OF LEFT LOWER EXTREMITY (HCC): ICD-10-CM

## 2025-02-21 ENCOUNTER — TELEPHONE (OUTPATIENT)
Dept: MEDICAL GROUP | Facility: LAB | Age: 50
End: 2025-02-21
Payer: COMMERCIAL

## 2025-02-21 NOTE — TELEPHONE ENCOUNTER
Patient called requesting referral to Vascular Surgeon.  Patient went to ER on  Sunday and had a blood clot.    Patient sent communication but has not yet heard back.    Patient states he is bedridden and needs this processed as quickly as possible.    Patient would also like to speak to provider regarding this issue.    Offered appt to patient, he states it's difficult to move and he would like his next appt to be w/ the vascular surgeon.      [phone] patient's contact #    Please contact

## 2025-03-11 ENCOUNTER — TELEPHONE (OUTPATIENT)
Dept: VASCULAR LAB | Facility: MEDICAL CENTER | Age: 50
End: 2025-03-11
Payer: COMMERCIAL

## 2025-03-11 NOTE — TELEPHONE ENCOUNTER
MAICO    Caller: Rick Francisco    Topic/issue: MEDICATION MANAGEMENT    Rick states that he needs a refill of the ELIQUIS that was prescribed during a hospital stay. Please advise.    Thank you,  Raymond MONSIVAIS    Callback Number: [phone] (home) [phone] (work)

## 2025-03-12 NOTE — TELEPHONE ENCOUNTER
Caller: Rick Francisco     Topic/issue: Patient is calling to get clarification the ELIQUIS and if it can be sent to [id number] - [place], [place] - [address] [[id number]]     Patient is also asking if there is more discount on the medication.    Callback Number: [phone]     Thank you,  Lanny DUQUE

## 2025-03-12 NOTE — TELEPHONE ENCOUNTER
Called and spoke to patient. He states that he will run out of supply next Tue, 03/18/25. He also notes that Eliquis is not covered by his insurance but he received his first month supply for free d/t the free trial card. Advised to keep NP appt on 03/17/25 with PharmD at [facility] who will be able to assist with Rx moving forward (may have to switch to alternative DOAC).    Will route to [facility] for assistance in determining which DOAC is preferred by pt's insurance to prevent delays in obtaining Rx after pt is seen on Mon.    Hiram Alonzo, ConstanzaD, BCACP

## 2025-03-13 ENCOUNTER — PATIENT MESSAGE (OUTPATIENT)
Dept: MEDICAL GROUP | Facility: LAB | Age: 50
End: 2025-03-13
Payer: COMMERCIAL

## 2025-03-13 DIAGNOSIS — I82.432 ACUTE DEEP VEIN THROMBOSIS (DVT) OF POPLITEAL VEIN OF LEFT LOWER EXTREMITY (HCC): ICD-10-CM

## 2025-03-14 ENCOUNTER — TELEPHONE (OUTPATIENT)
Dept: VASCULAR LAB | Facility: MEDICAL CENTER | Age: 50
End: 2025-03-14
Payer: COMMERCIAL

## 2025-03-14 NOTE — TELEPHONE ENCOUNTER
Received Refill PA request via  EMR   for ELIQUIS 5MG TABLETS . (Quantity:180, Day Supply:90)     Insurance: [employer]   Member ID:  [id number]  BIN: [id number]  PCN: [facility]  Group: [id number]     Ran Test claim via [place] & medication  pays for $895.24 copay. Received EMR from clinic staff that pt expressed concerns  that Eliquis is not coverd by the plan as was seeking for an alternative copay options for the pt. Based on my investigation, pt is eligible for copay card enrollment to help lower the cost down to $10/month ; $30/3 month supply.     Will outreach to pt to offer copay card enrollment and also to extend the offer to use [facility]     RACHELLE Green, PhT  Vascular Pharmacy Liaison (Rx Coordinator)  P: [phone]  3/14/2025 4:56 PM

## 2025-03-17 ENCOUNTER — ANTICOAGULATION VISIT (OUTPATIENT)
Dept: MEDICAL GROUP | Facility: MEDICAL CENTER | Age: 50
End: 2025-03-17
Payer: COMMERCIAL

## 2025-03-17 VITALS
WEIGHT: 239.86 LBS | DIASTOLIC BLOOD PRESSURE: 73 MMHG | BODY MASS INDEX: 29.21 KG/M2 | HEART RATE: 78 BPM | SYSTOLIC BLOOD PRESSURE: 120 MMHG | HEIGHT: 76 IN

## 2025-03-17 DIAGNOSIS — I82.432 ACUTE DEEP VEIN THROMBOSIS (DVT) OF POPLITEAL VEIN OF LEFT LOWER EXTREMITY (HCC): ICD-10-CM

## 2025-03-17 DIAGNOSIS — I82.4Y2 ACUTE VENOUS EMBOLISM AND THROMBOSIS OF DEEP VESSELS OF PROXIMAL END OF LEFT LOWER EXTREMITY (HCC): ICD-10-CM

## 2025-03-17 PROBLEM — I82.4Y9 ACUTE VENOUS EMBOLISM AND THROMBOSIS OF DEEP VESSELS OF PROXIMAL LOWER EXTREMITY (HCC): Status: ACTIVE | Noted: 2025-03-17

## 2025-03-17 PROCEDURE — 99211 OFF/OP EST MAY X REQ PHY/QHP: CPT | Performed by: STUDENT IN AN ORGANIZED HEALTH CARE EDUCATION/TRAINING PROGRAM

## 2025-03-17 PROCEDURE — 3078F DIAST BP <80 MM HG: CPT | Performed by: STUDENT IN AN ORGANIZED HEALTH CARE EDUCATION/TRAINING PROGRAM

## 2025-03-17 PROCEDURE — RXMED WILLOW AMBULATORY MEDICATION CHARGE: Performed by: NURSE PRACTITIONER

## 2025-03-17 PROCEDURE — 3074F SYST BP LT 130 MM HG: CPT | Performed by: STUDENT IN AN ORGANIZED HEALTH CARE EDUCATION/TRAINING PROGRAM

## 2025-03-17 ASSESSMENT — FIBROSIS 4 INDEX: FIB4 SCORE: 1.18

## 2025-03-17 NOTE — Clinical Note
Hello - can you please check in about [employer] pricing for Eliquis for this patient - he cannot afford $700 copay.   He will be out of his medication tomorrow and needs to pick this up today. Thank you!!

## 2025-03-17 NOTE — PROGRESS NOTES
NEW DOAC     Anticoagulation Patient Findings      PCP: Jakub Cornejo D.O.  Dx: LLE DVT  CHADSVASC = n/a  HAS-BLED = n/a  Target End Date = 3 months (5/16/25)    Pt Hx: Patient diagnosed with DVT in the ED 2/16/25. Denies any trauma. Denies a history of blood clots. Denies family history of blood clots or prolonged travel.     Ultrasound: 2/16/25  FINDINGS:     REAL-TIME GRAY-SCALE IMAGING:  Real-time gray-scale imaging reveals no evidence of focal wall thickening.     COLOR AND DUPLEX DOPPLER IMAGING:  Echogenic regional present within the LEFT mid femoral vein extending into the calf involving peroneal and posterior tibial veins.  Absent Doppler flow abnormal response to compression.     IMPRESSION:     LEFT leg DVT from mid femoral vein extending into the calf, likely acute.    Labs:  Lab Results   Component Value Date/Time    WBC 10.7 02/16/2025 12:25 PM    RBC 4.92 02/16/2025 12:25 PM    HEMOGLOBIN 16.1 02/16/2025 12:25 PM    HEMATOCRIT 45.4 02/16/2025 12:25 PM    MCV 92.3 02/16/2025 12:25 PM    MCH 32.7 02/16/2025 12:25 PM    MCHC 35.5 02/16/2025 12:25 PM    MPV 10.8 02/16/2025 12:25 PM    NEUTSPOLYS 72.80 (H) 02/16/2025 12:25 PM    LYMPHOCYTES 17.20 (L) 02/16/2025 12:25 PM    MONOCYTES 8.10 02/16/2025 12:25 PM    EOSINOPHILS 1.20 02/16/2025 12:25 PM    BASOPHILS 0.30 02/16/2025 12:25 PM      Lab Results   Component Value Date/Time    SODIUM 139 02/16/2025 12:25 PM    POTASSIUM 4.3 02/16/2025 12:25 PM    CHLORIDE 102 02/16/2025 12:25 PM    CO2 23 02/16/2025 12:25 PM    GLUCOSE 109 (H) 02/16/2025 12:25 PM    BUN 11 02/16/2025 12:25 PM    CREATININE 1.13 02/16/2025 12:25 PM          Pt is new to Eliquis and new to [facility]. Discussed:   Indication for DOAC therapy.  Importance of monitoring and compliance.   Monitoring parameters, signs and symptoms of bleeding or clotting.  DOAC therapy, side effects, potential DDIs, OTC medications  Hormonal therapy -n/a  Pregnancy -n/a  DDI - n/a  Pt NOT on antiplatelet/NSAID  therapy   Lifestyle safety, ie smoking, ETOH, hobby safety, fall safety/prevention  Procedures for missed doses or suspected missed doses, surgeries/procedures, travel, dental work, any medication changes    Continue Eliquis 5 mg BID    DOAC affordable = no, $700    Samples provided: no    Labs to be completed prior to next f/u - CBC, CMP    F/U - 1 month with vascular provider    Crystal Cassidy, ConstanzaD      Added [facility] to care team   CC: Dr Bloch, Vascular pharmacy coordinator

## 2025-03-18 ENCOUNTER — DOCUMENTATION (OUTPATIENT)
Dept: VASCULAR LAB | Facility: MEDICAL CENTER | Age: 50
End: 2025-03-18
Payer: COMMERCIAL

## 2025-03-18 ENCOUNTER — PHARMACY VISIT (OUTPATIENT)
Dept: PHARMACY | Facility: MEDICAL CENTER | Age: 50
End: 2025-03-18
Payer: COMMERCIAL

## 2025-03-18 NOTE — PROGRESS NOTES
Initial anticoagulation clinic note and most recent ER note reviewed  Patient started on anticoagulation with apixaban after being diagnosed with femoral DVT, first episode, appears unprovoked.    We'll continue with at least 3 months of oral anticoagulation after which time we can discuss risks and benefits of extended anticoagulation in vascular medicine clinic as scheduled.     ACCP guidelines to have a IIb indication for extended anticoagulation in patients with unprovoked DVT/PE who tolerated anticoagulation well.    Will defer any indicated age appropriate screening for occult malignancy to pcp.      Michael J. Bloch, MD  Anticoagulation Center    Cc:  FILOMENA Cornejo

## 2025-04-25 ENCOUNTER — APPOINTMENT (OUTPATIENT)
Dept: MEDICAL GROUP | Facility: LAB | Age: 50
End: 2025-04-25

## 2025-04-25 VITALS
TEMPERATURE: 97.1 F | SYSTOLIC BLOOD PRESSURE: 128 MMHG | HEART RATE: 88 BPM | WEIGHT: 246.2 LBS | HEIGHT: 76 IN | BODY MASS INDEX: 29.98 KG/M2 | DIASTOLIC BLOOD PRESSURE: 68 MMHG | OXYGEN SATURATION: 97 %

## 2025-04-25 DIAGNOSIS — Z12.11 COLON CANCER SCREENING: ICD-10-CM

## 2025-04-25 DIAGNOSIS — I82.412 ACUTE DEEP VEIN THROMBOSIS (DVT) OF FEMORAL VEIN OF LEFT LOWER EXTREMITY (HCC): ICD-10-CM

## 2025-04-25 PROBLEM — I82.402 ACUTE DEEP VEIN THROMBOSIS (DVT) OF LEFT LOWER EXTREMITY (HCC): Status: ACTIVE | Noted: 2025-03-17

## 2025-04-25 PROCEDURE — 3078F DIAST BP <80 MM HG: CPT | Performed by: STUDENT IN AN ORGANIZED HEALTH CARE EDUCATION/TRAINING PROGRAM

## 2025-04-25 PROCEDURE — 3074F SYST BP LT 130 MM HG: CPT | Performed by: STUDENT IN AN ORGANIZED HEALTH CARE EDUCATION/TRAINING PROGRAM

## 2025-04-25 PROCEDURE — 99396 PREV VISIT EST AGE 40-64: CPT | Performed by: STUDENT IN AN ORGANIZED HEALTH CARE EDUCATION/TRAINING PROGRAM

## 2025-04-25 ASSESSMENT — ENCOUNTER SYMPTOMS
CHILLS: 0
FEVER: 0
SHORTNESS OF BREATH: 0

## 2025-04-25 ASSESSMENT — FIBROSIS 4 INDEX: FIB4 SCORE: 1.18

## 2025-04-25 ASSESSMENT — PATIENT HEALTH QUESTIONNAIRE - PHQ9: CLINICAL INTERPRETATION OF PHQ2 SCORE: 0

## 2025-04-25 NOTE — PROGRESS NOTES
Subjective:     Subjective:     CC:   Chief Complaint   Patient presents with    Annual Exam       HPI:   Rick Francisco is a 49 y.o. male who presents for annual exam    Last Colorectal Cancer Screening: already ordered   Last Tdap: 2023  Received HPV series: Aged out  Hx STDs: No     Exercise: 45 minute walk daily   Diet: carnivore diet     He  has a past medical history of Patient denies medical problems.  He  has a past surgical history that includes hernia repair.    Family History   Problem Relation Age of Onset    Hyperlipidemia Mother     Leukemia Father     Hypertension Maternal Grandfather      Social History     Tobacco Use    Smoking status: Former     Types: Cigarettes    Smokeless tobacco: Never   Vaping Use    Vaping status: Never Used   Substance Use Topics    Alcohol use: Yes     Alcohol/week: 1.8 oz     Types: 3 Cans of beer per week    Drug use: Never     He  reports being sexually active.    Patient Active Problem List    Diagnosis Date Noted    Acute deep vein thrombosis (DVT) of left lower extremity (HCC) 03/17/2025    Vitamin D deficiency 06/09/2023    Subclinical hypothyroidism 06/09/2023    Annual physical exam 06/09/2023    Gastroesophageal reflux disease without esophagitis 04/14/2023    Former smoker 04/14/2023    Other hyperlipidemia 04/14/2023    Obesity (BMI 30-39.9) 04/14/2023     Current Outpatient Medications   Medication Sig Dispense Refill    apixaban (ELIQUIS) 5mg Tab Take 1 Tablet by mouth 2 times a day. 180 Tablet 0     No current facility-administered medications for this visit.     Allergies   Allergen Reactions    Penicillins Hives       Review of Systems   Review of Systems   Constitutional:  Negative for chills and fever.   Respiratory:  Negative for shortness of breath.    Cardiovascular:  Negative for chest pain.        Objective:   /68 (BP Location: Right arm, Patient Position: Sitting, BP Cuff Size: Large adult)   Pulse 88   Temp 36.2 °C (97.1 °F)  "(Temporal)   Ht 1.93 m (6' 4\")   Wt 112 kg (246 lb 3.2 oz)   SpO2 97%   BMI 29.97 kg/m²      Wt Readings from Last 4 Encounters:   04/25/25 112 kg (246 lb 3.2 oz)   03/17/25 109 kg (239 lb 13.8 oz)   02/16/25 112 kg (247 lb 9.2 oz)   06/09/23 115 kg (254 lb 3.1 oz)           Physical Exam:  Physical Exam  Constitutional:       General: He is not in acute distress.     Appearance: Normal appearance. He is not ill-appearing.   HENT:      Head: Normocephalic and atraumatic.      Right Ear: Tympanic membrane and ear canal normal.      Left Ear: Tympanic membrane and ear canal normal.      Mouth/Throat:      Mouth: Mucous membranes are moist.      Pharynx: Oropharynx is clear.   Eyes:      Extraocular Movements: Extraocular movements intact.      Pupils: Pupils are equal, round, and reactive to light.   Neck:      Thyroid: No thyromegaly.   Cardiovascular:      Rate and Rhythm: Normal rate and regular rhythm.      Heart sounds: Normal heart sounds.   Pulmonary:      Effort: Pulmonary effort is normal.      Breath sounds: Normal breath sounds.   Abdominal:      General: Abdomen is flat. Bowel sounds are normal.      Palpations: Abdomen is soft.   Musculoskeletal:      Cervical back: Normal range of motion and neck supple.      Right lower leg: No edema.      Left lower leg: No edema.   Lymphadenopathy:      Cervical: No cervical adenopathy.   Skin:     General: Skin is warm and dry.   Neurological:      General: No focal deficit present.      Mental Status: He is alert.   Psychiatric:         Mood and Affect: Mood normal.         Behavior: Behavior normal.         Thought Content: Thought content normal.         Judgment: Judgment normal.             Assessment and Plan:     Problem List Items Addressed This Visit       Acute deep vein thrombosis (DVT) of left lower extremity (HCC)     Other Visit Diagnoses         Colon cancer screening        Relevant Orders    Referral to GI for Colonoscopy             Health " maintenance:    Labs per orders  Immunizations per orders  Age-appropriate guidance discussed including diet and exercise  Discussed benefits of completing     Follow-up: 1 year annual

## 2025-04-30 ENCOUNTER — PATIENT MESSAGE (OUTPATIENT)
Dept: VASCULAR LAB | Facility: MEDICAL CENTER | Age: 50
End: 2025-04-30

## 2025-04-30 ENCOUNTER — OFFICE VISIT (OUTPATIENT)
Dept: VASCULAR LAB | Facility: MEDICAL CENTER | Age: 50
End: 2025-04-30
Attending: NURSE PRACTITIONER
Payer: COMMERCIAL

## 2025-04-30 VITALS
BODY MASS INDEX: 30.08 KG/M2 | SYSTOLIC BLOOD PRESSURE: 136 MMHG | HEIGHT: 76 IN | DIASTOLIC BLOOD PRESSURE: 82 MMHG | WEIGHT: 247 LBS | HEART RATE: 98 BPM

## 2025-04-30 DIAGNOSIS — D68.59 HYPERCOAGULABLE STATE (HCC): ICD-10-CM

## 2025-04-30 DIAGNOSIS — I82.412 ACUTE DEEP VEIN THROMBOSIS (DVT) OF FEMORAL VEIN OF LEFT LOWER EXTREMITY (HCC): Primary | ICD-10-CM

## 2025-04-30 PROCEDURE — 99212 OFFICE O/P EST SF 10 MIN: CPT

## 2025-04-30 ASSESSMENT — ENCOUNTER SYMPTOMS
BRUISES/BLEEDS EASILY: 0
PALPITATIONS: 0
BLOOD IN STOOL: 0

## 2025-04-30 ASSESSMENT — FIBROSIS 4 INDEX: FIB4 SCORE: 1.18

## 2025-04-30 NOTE — PROGRESS NOTES
VASC[facility] MED[facility]INE CLINIC - INITIAL VISIT (ANTICOAGULATION)  04/30/25     Referred for length of therapy (LOT) determination of anticoagulation in context of acute venothromboembolic disease    Subjective    HPI:    VTE disease / Anticoagulation:   Date of initiation of anticoagulation: 2/16/2025  Current symptoms:  Denies current SOB, CP, leg swelling, edema, leg pain, cyanosis of digits, redness of extremities.  Current antithrombotic agent: Eliquis  Barriers to Care/SDOH  Complications: none, tolerating well, no bleeding or bruising   Adherence: reports complete, no missed doses      _____Pertinent VTE pmhx:   Date of Diagnosis: 2/16/2025  Type of Venous thromboembolic disease (VTE): LLE mid femoral vein extending into peroneal and posterior tibial veins   Preceding/presenting symptoms: pain in LLE worsening x 2 days;   Antithrombotic therapy at time of VTE event: no  Any personal previous VTE hx? No  Any family VTE hx? Unclear; his mom is on blood thinners-- he is unsure why-- he will ask     _____UNPROVOKED VS PROVOKED:   Recent surgery ? No  Recent trauma ? No  Smoker?  reports that he has quit smoking. His smoking use included cigarettes. He has never used smokeless tobacco.    Extended travel? Drives in car to [place] area about 6 hours at a time   Other periods of immobility? sits in chair for work 4-5 hours at a time   Other known or potential risk factors for VTE disease:  no  MEN:  Hx of cancer or abnormal cancer screenings: has referral for colonoscopy  Hx of Testosterone therapy: no    Hypercoaguability work-up completed? Will complete  (see assessment for details)     OTHER CVD: no    HTN: Slightly elevated in office   HLD: He has orders from PCP to get labs done at a discounted location d/t insurance costs  Dysglycemia: unknown    Patient Active Problem List   Diagnosis    Gastroesophageal reflux disease without esophagitis    Former smoker    Other hyperlipidemia    Obesity (BMI 30-39.9)    Vitamin D  "deficiency    Subclinical hypothyroidism    Annual physical exam    Acute deep vein thrombosis (DVT) of left lower extremity (HCC)    Hypercoagulable state (HCC)      Past Surgical History:   Procedure Laterality Date    HERNIA REPAIR        Family History   Problem Relation Age of Onset    Hyperlipidemia Mother     Leukemia Father     Hypertension Maternal Grandfather       Social History     Tobacco Use    Smoking status: Former     Types: Cigarettes    Smokeless tobacco: Never   Vaping Use    Vaping status: Never Used   Substance Use Topics    Alcohol use: Yes     Alcohol/week: 1.8 oz     Types: 3 Cans of beer per week    Drug use: Never       DIET AND EXERCISE:  Weight Change:stable  Diet: common adult  Exercise: minimal exercise     Penicillins  Review of Systems   HENT:  Negative for nosebleeds.    Cardiovascular:  Negative for chest pain, palpitations and leg swelling.   Gastrointestinal:  Negative for blood in stool.   Genitourinary:  Negative for hematuria.   Endo/Heme/Allergies:  Does not bruise/bleed easily.          Objective       Objective:     Vitals:    04/30/25 1458 04/30/25 1502   BP: 133/77 136/82   BP Location: Left arm Left arm   Patient Position: Sitting Sitting   BP Cuff Size: Large adult Large adult   Pulse: 97 98   Weight: 112 kg (247 lb)    Height: 1.93 m (6' 4\")       Physical Exam  Constitutional:       General: He is not in acute distress.     Appearance: Normal appearance.   HENT:      Head: Normocephalic.      Nose: Nose normal.   Eyes:      Pupils: Pupils are equal, round, and reactive to light.   Cardiovascular:      Rate and Rhythm: Normal rate and regular rhythm.      Pulses: Normal pulses.      Heart sounds: No murmur heard.  Pulmonary:      Effort: Pulmonary effort is normal. No respiratory distress.      Breath sounds: Normal breath sounds.   Musculoskeletal:         General: No swelling or tenderness. Normal range of motion.   Skin:     General: Skin is warm and dry. "   Neurological:      Mental Status: He is alert and oriented to person, place, and time. Mental status is at baseline.   Psychiatric:         Mood and Affect: Mood normal.         Thought Content: Thought content normal.        DATA REVIEW    Lab Results   Component Value Date/Time    CHOLSTRLTOT 243 (H) 04/19/2023 06:47 AM     (H) 04/19/2023 06:47 AM    HDL 37 (A) 04/19/2023 06:47 AM    TRIGLYCERIDE 204 (H) 04/19/2023 06:47 AM       Lab Results   Component Value Date/Time    SODIUM 139 02/16/2025 12:25 PM    POTASSIUM 4.3 02/16/2025 12:25 PM    CHLORIDE 102 02/16/2025 12:25 PM    CO2 23 02/16/2025 12:25 PM    GLUCOSE 109 (H) 02/16/2025 12:25 PM    BUN 11 02/16/2025 12:25 PM    CREATININE 1.13 02/16/2025 12:25 PM     Lab Results   Component Value Date/Time    ALKPHOSPHAT 76 02/16/2025 12:25 PM    ASTSGOT 18 02/16/2025 12:25 PM    ALTSGPT 22 02/16/2025 12:25 PM    TBILIRUBIN 0.6 02/16/2025 12:25 PM       INR   Date Value Ref Range Status   02/16/2025 1.08 0.87 - 1.13 Final     Comment:     Reference range:  INR - Non-therapeutic Reference Range: 0.87-1.13  INR - Therapeutic Reference Range: 2.0-4.0       CAC score = zero 2023    LLE venous duplex 2/2025  LEFT leg DVT from mid femoral vein extending into the calf, likely acute.        Medical Decision Making:  Today's Assessment / Status / Plan:     1. Acute deep vein thrombosis (DVT) of femoral vein of left lower extremity (HCC)  apixaban (ELIQUIS) 5mg Tab      2. Hypercoagulable state (HCC)           1) VTE disease: LLE mid femoral vein extending into peroneal and posterior tibial veins   Thrombophilia/hypercoag evaluation:  Not completed   Provoked? No  PLAN:  - no imaging surveillance needed at this time  - antithrombotic plan as noted below   - counseled on signs and symptoms of acute VTE that require seeking prompt attention in the ED including shortness of breath, chest pain, pain with deep inhalation, acute leg swelling and/or pain in calf or leg   -  elevate legs as much as possible, use compression stockings/socks if directed by your provider  - avoid hormonal therapies containing E2 or testosterone, raloxifene, tamoxifene, and other meds increasing risk for VTE   - avoid or limit sedentary periods  - continue complete avoidance of tobacco products  - if having any invasive procedure,please make sure the doctor knows of your history of blood clots and current anticoagulation status  - recommend f/u with PCP for age-appropriate cancer-screening due to risk of malig-associated VTE    ANTITHROMBOTIC THERAPY  Date of initiation: 2/16/2025  HAS-BLED bleeding risk calc ([email or web address]): 0 pts, 0.9%, low risk   Factors to consider for indefinite OAC: Unprovoked VTE event, Life-threatening or very extensive proximal DVT , and Male sex   Last CBC, BMP: reviewed above   Expected duration: reviewed standard of care as per Chest 2021 guidelines is 3-6 months minimum on full dose OAC.  After review of risks/benefits/alternatives, through shared decision-making, we will continue anticoagulation for 6 months as he will be traveling for extended periods for long flights.  Recommend he remain on AC at this time.  Discussed option of hypercoag panel evaluation-- if his mother has a history of unprovoked VTE we will consider this option-- however it will likely be expensive through his insurance.  Explained to pt the recommendation to consider extended AC in patients with unprovoked clots who tolerate blood thinners.  He is reluctant to be on AC long term but will continue through 6 months.  Will continue to discuss options at follow up appt.    PLAN:  - continue Eliquis 5mg BID consider extended anticoagulation at next appt  - consider hypercoag panel if cost effective   - stressed strict adherence to tx and avoid early termination due to increased risk for recurrent VTE  - Q6mo CBC, BMP (monitor CrCl) while on OAC   - avoid Aspirin and NSAIDs while anticoagulated   - limit or avoid  "sports or high-risk for head injury to reduce risk for intracranial bleeds  - advised to seek urgent eval for any GI bleeding, stroke-like sx, or minor bleeding >30min duration   - advised reversal agents are available for all agents     LIPID MANAGEMENT  Qualifies for Statin Therapy Based on Most Recent ACC/AHA Guidelines: consider Primary Prevention - 40-76yo, LDLc >70, <190 w/o DM  The 10-year ASCVD risk score (Gracie STEPHENS, et al., 2019) is: 7.5%,  5 - 7.5% \"borderline risk\"  Major ASCVD events: None  High-risk conditions: None   Risk-enhancers: Reports his mom has high cholesterol; no ASCVD events  Currently on Statin: No  Tx goals:  LDL-C <100 mg/dl  apoB <70 mg/dl  At goal? Due for recheck -- he has orders from his PCP   CAC score = zero in 2023  PLAN:  - reinforced ongoing TLC measures as noted   - monitor labs as recommended by PCP     BLOOD PRESSURE MANAGEMENT  Office BP Goal ACC/AHAgoal <130/80  Home BP at goal:  unknown  Office BP at goal:  no, slightly elevated  24h ABPM:  not ordered to date   RDN candidate? NO  Contributing factors:    PLAN:  Monitoring:   - consider home BP monitoring if elevated in office  - continue monitoring in office visits    GLYCEMIC STATUS:  Elevated fasting glucose  - Follow over time  - Consider A1C if remains elevated     LIFESTYLE INTERVENTIONS  TOBACCO:  reports that he has quit smoking. His smoking use included cigarettes. He has never used smokeless tobacco.   - continued complete avoidance of all tobacco products   PHYSICAL ACTIVITY: at least 150 min per week of moderate intensity  NUTRITION: Weight reduction - reduce caloric intake by 300 - 500 kcal/day to achieve 1-2lb weight loss per week    ETOH: unknown  WT MGMT: maintain healthy weight (reduction 1kg = reduction 1mmHg BP)     Studies to Be Obtained: none  Labs to Be Obtained: none    Follow up in: 4 months    JULIANNA Sullivan   [facility]  [facility] " [facility]  [phone]

## 2025-05-28 PROCEDURE — RXMED WILLOW AMBULATORY MEDICATION CHARGE: Performed by: NURSE PRACTITIONER

## 2025-05-30 ENCOUNTER — PHARMACY VISIT (OUTPATIENT)
Dept: PHARMACY | Facility: MEDICAL CENTER | Age: 50
End: 2025-05-30
Payer: COMMERCIAL

## 2025-06-05 DIAGNOSIS — I82.412 ACUTE DEEP VEIN THROMBOSIS (DVT) OF FEMORAL VEIN OF LEFT LOWER EXTREMITY (HCC): Primary | ICD-10-CM

## 2025-06-05 DIAGNOSIS — D68.59 HYPERCOAGULABLE STATE (HCC): ICD-10-CM

## 2025-06-05 NOTE — PROGRESS NOTES
See mychart messaging.  Pt found to have family history of DVT with his mom being positive for 2 variants- factor V leiden and factor II DNA analysis.  Will order partial hypercoag panel for pt to complete while on anticoagulation.  We will discuss if he needs to complete the panel (if off AC) at his next visit.    May Estrada [facility]

## 2025-06-17 ENCOUNTER — PATIENT MESSAGE (OUTPATIENT)
Dept: VASCULAR LAB | Facility: MEDICAL CENTER | Age: 50
End: 2025-06-17
Payer: COMMERCIAL

## 2025-06-17 DIAGNOSIS — Z79.01 CHRONIC ANTICOAGULATION: ICD-10-CM

## 2025-06-26 NOTE — PROCEDURE: COUNSELING
Detail Level: Zone
Detail Level: Detailed
PAST SURGICAL HISTORY:  No significant past surgical history

## 2025-08-28 ENCOUNTER — SPECIALTY PHARMACY (OUTPATIENT)
Dept: VASCULAR LAB | Facility: MEDICAL CENTER | Age: 50
End: 2025-08-28
Payer: COMMERCIAL

## 2025-09-03 ENCOUNTER — APPOINTMENT (OUTPATIENT)
Dept: VASCULAR LAB | Facility: MEDICAL CENTER | Age: 50
End: 2025-09-03
Payer: COMMERCIAL